# Patient Record
Sex: FEMALE | Race: WHITE | Employment: OTHER | URBAN - METROPOLITAN AREA
[De-identification: names, ages, dates, MRNs, and addresses within clinical notes are randomized per-mention and may not be internally consistent; named-entity substitution may affect disease eponyms.]

---

## 2017-11-15 ENCOUNTER — HOSPITAL ENCOUNTER (OUTPATIENT)
Dept: GENERAL RADIOLOGY | Age: 72
Discharge: HOME | End: 2017-11-15
Attending: INTERNAL MEDICINE
Payer: MEDICARE

## 2017-11-15 ENCOUNTER — HOSPITAL ENCOUNTER (OUTPATIENT)
Dept: MAMMOGRAPHY | Age: 72
Discharge: HOME | End: 2017-11-15
Attending: INTERNAL MEDICINE
Payer: MEDICARE

## 2017-11-15 DIAGNOSIS — Z78.0 MENOPAUSE: ICD-10-CM

## 2017-11-15 DIAGNOSIS — Z12.31 VISIT FOR SCREENING MAMMOGRAM: ICD-10-CM

## 2017-11-15 DIAGNOSIS — Z78.0 POST-MENOPAUSAL: ICD-10-CM

## 2017-11-15 DIAGNOSIS — Z13.820 ENCOUNTER FOR SCREENING FOR OSTEOPOROSIS: ICD-10-CM

## 2017-11-15 PROCEDURE — 77063 BREAST TOMOSYNTHESIS BI: CPT

## 2017-11-15 PROCEDURE — 77080 DXA BONE DENSITY AXIAL: CPT

## 2018-10-15 ENCOUNTER — OFFICE VISIT (OUTPATIENT)
Dept: INTERNAL MEDICINE CLINIC | Age: 73
End: 2018-10-15

## 2018-10-15 VITALS
HEART RATE: 69 BPM | SYSTOLIC BLOOD PRESSURE: 171 MMHG | DIASTOLIC BLOOD PRESSURE: 89 MMHG | WEIGHT: 178.2 LBS | RESPIRATION RATE: 16 BRPM | BODY MASS INDEX: 32.79 KG/M2 | TEMPERATURE: 98.2 F | HEIGHT: 62 IN

## 2018-10-15 DIAGNOSIS — E78.2 MIXED HYPERLIPIDEMIA: ICD-10-CM

## 2018-10-15 DIAGNOSIS — E03.9 ACQUIRED HYPOTHYROIDISM: ICD-10-CM

## 2018-10-15 DIAGNOSIS — I10 ESSENTIAL HYPERTENSION: Primary | ICD-10-CM

## 2018-10-15 DIAGNOSIS — G89.29 OTHER CHRONIC PAIN: ICD-10-CM

## 2018-10-15 RX ORDER — CELECOXIB 200 MG/1
CAPSULE ORAL
COMMUNITY
Start: 2018-07-23 | End: 2019-06-17 | Stop reason: SDUPTHER

## 2018-10-15 RX ORDER — DICLOFENAC SODIUM 10 MG/G
GEL TOPICAL
COMMUNITY
Start: 2018-09-06

## 2018-10-15 RX ORDER — CITALOPRAM 10 MG/1
TABLET ORAL
Refills: 0 | COMMUNITY
Start: 2018-09-04 | End: 2019-01-18

## 2018-10-15 RX ORDER — TRAMADOL HYDROCHLORIDE 50 MG/1
TABLET ORAL
Refills: 0 | COMMUNITY
Start: 2018-10-11 | End: 2019-02-27 | Stop reason: SDUPTHER

## 2018-10-15 RX ORDER — ATORVASTATIN CALCIUM 20 MG/1
TABLET, FILM COATED ORAL
Refills: 0 | COMMUNITY
Start: 2018-10-11 | End: 2019-06-17 | Stop reason: SDUPTHER

## 2018-10-15 RX ORDER — SULFAMETHOXAZOLE AND TRIMETHOPRIM 800; 160 MG/1; MG/1
TABLET ORAL
Refills: 0 | COMMUNITY
Start: 2018-09-04

## 2018-10-15 RX ORDER — RABEPRAZOLE SODIUM 20 MG/1
TABLET, DELAYED RELEASE ORAL
Refills: 0 | COMMUNITY
Start: 2018-07-16 | End: 2019-01-18 | Stop reason: SDUPTHER

## 2018-10-15 RX ORDER — LEVOTHYROXINE SODIUM 25 UG/1
TABLET ORAL
Refills: 0 | COMMUNITY
Start: 2018-09-04 | End: 2019-02-28 | Stop reason: SDUPTHER

## 2018-10-15 RX ORDER — PREGABALIN 25 MG/1
CAPSULE ORAL
Refills: 0 | COMMUNITY
Start: 2018-08-01

## 2018-10-15 NOTE — PROGRESS NOTES
ROOM # 17 Identified pt with two pt identifiers(name and ). Reviewed record in preparation for visit and have obtained necessary documentation. Chief Complaint Patient presents with [address] [place] [address] Ang So preferred language for health care discussion is english/other. Is the patient using any DME equipment during OV? NO Ang So is due for: 
Health Maintenance Due Topic  Hepatitis C Screening  DTaP/Tdap/Td series (1 - Tdap)  Shingrix Vaccine Age 50> (1 of 2)  FOBT Q 1 YEAR AGE 50-75  GLAUCOMA SCREENING Q2Y  Pneumococcal 65+ Low/Medium Risk (1 of 2 - PCV13)  Influenza Age 5 to Adult [address] Maintenance reviewed and discussed per provider Please order/place referral if appropriate. Advance Directive: 1. Do you have an advance directive in place? Patient Reply: NO 
 
2. If not, would you like material regarding how to put one in place? NO Coordination of Care: 1. Have you been to the ER, urgent care clinic since your last visit? Hospitalized since your last visit? NO 
 
2. Have you seen or consulted any other health care providers outside of the [address] [place] [address] since your last visit? Include any pap smears or colon screening. YES Patient is accompanied by spouse I have received verbal consent from Ang So to discuss any/all medical information while they are present in the room. Learning Assessment: 
No flowsheet data found. Depression Screening: PHQ over the last two weeks 10/15/2018 Little interest or pleasure in doing things Not at all Feeling down, depressed, irritable, or hopeless Not at all Total Score PHQ 2 0 Abuse Screening: No flowsheet data found. Fall Risk Fall Risk Assessment, last 12 mths 10/15/2018 Able to walk? Yes Fall in past 12 months?  No

## 2018-10-15 NOTE — PATIENT INSTRUCTIONS
Learning About High Blood Pressure What is high blood pressure? Blood pressure is a measure of how hard the blood pushes against the walls of your arteries. It's normal for blood pressure to go up and down throughout the day, but if it stays up, you have high blood pressure. Another name for high blood pressure is hypertension. Two numbers tell you your blood pressure. The first number is the systolic pressure. It shows how hard the blood pushes when your heart is pumping. The second number is the diastolic pressure. It shows how hard the blood pushes between heartbeats, when your heart is relaxed and filling with blood. A blood pressure of less than 120/80 (say \"120 over 80\") is ideal for an adult. High blood pressure is 130/80 or higher. You have high blood pressure if your top number is 130 or higher or your bottom number is 80 or higher, or both. What happens when you have high blood pressure? · Blood flows through your arteries with too much force. Over time, this damages the walls of your arteries. But you can't feel it. High blood pressure usually doesn't cause symptoms. · Fat and calcium start to build up in your arteries. This buildup is called plaque. Plaque makes your arteries narrower and stiffer. Blood can't flow through them as easily. · This lack of good blood flow starts to damage some of the organs in your body. This can lead to problems such as coronary artery disease and heart attack, heart failure, stroke, kidney failure, and eye damage. How can you prevent high blood pressure? · Stay at a healthy weight. · Try to limit how much sodium you eat to less than 2,300 milligrams (mg) a day. If you limit your sodium to 1,500 mg a day, you can lower your blood pressure even more. ¨ Buy foods that are labeled \"unsalted,\" \"sodium-free,\" or \"low-sodium. \" Foods labeled \"reduced-sodium\" and \"light sodium\" may still have too much sodium. ¨ Flavor your food with garlic, lemon juice, onion, vinegar, herbs, and spices instead of salt. Do not use soy sauce, steak sauce, onion salt, garlic salt, mustard, or ketchup on your food. ¨ Use less salt (or none) when recipes call for it. You can often use half the salt a recipe calls for without losing flavor. · Be physically active. Get at least 30 minutes of exercise on most days of the week. Walking is a good choice. You also may want to do other activities, such as running, swimming, cycling, or playing tennis or team sports. · Limit alcohol to 2 drinks a day for men and 1 drink a day for women. · Eat plenty of fruits, vegetables, and low-fat dairy products. Eat less saturated and total fats. How is high blood pressure treated? · Your doctor will suggest making lifestyle changes. For example, your doctor may ask you to eat healthy foods, quit smoking, lose extra weight, and be more active. · If lifestyle changes don't help enough, your doctor may recommend that you take medicine. · When blood pressure is very high, medicines are needed to lower it. Follow-up care is a key part of your treatment and safety. Be sure to make and go to all appointments, and call your doctor if you are having problems. It's also a good idea to know your test results and keep a list of the medicines you take. Where can you learn more? Go to http://ayden-clifford.info/. Enter P501 in the search box to learn more about \"Learning About High Blood Pressure. \" Current as of: December 6, 2017 Content Version: 11.8 © 7875-1962 eFuelDepot. Care instructions adapted under license by [employer]Chatter (which disclaims liability or warranty for this information). If you have questions about a medical condition or this instruction, always ask your healthcare professional. [address] any warranty or liability for your use of this information.

## 2018-10-15 NOTE — MR AVS SNAPSHOT
[address] [place] [address] 
 
 
 [address] [facility] [address] 
[phone] Patient: Ang Arceo MRN: [id number] CIJ:[id number] Visit Information Date & Time Provider Department Dept. Phone Encounter #  
 10/15/2018  9:45 AM [facility], [address] [phone] [id number] Follow-up Instructions Return in about 3 months (around 1/15/2019), or if symptoms worsen or fail to improve, for hypertension. Upcoming Health Maintenance Date Due Hepatitis C Screening 1945 DTaP/Tdap/Td series (1 - Tdap) 9/23/1967 Shingrix Vaccine Age 50> (1 of 2) 9/23/1996 FOBT Q 1 YEAR AGE 50-75 9/23/1996 GLAUCOMA SCREENING Q2Y 9/23/2011 Pneumococcal 65+ Low/Medium Risk (1 of 2 - PCV13) 9/23/2011 Influenza Age 5 to Adult 8/1/2018 MEDICARE YEARLY EXAM 10/11/2018 BREAST CANCER SCRN MAMMOGRAM 11/15/2019 Allergies as of 10/15/2018  Review Complete On: 10/15/2018 By: Ricky Charles Severity Noted Reaction Type Reactions Other Plant, Animal, Environmental  02/24/2016    Other (comments) Pollen and mold-Seasonal  
  
Current Immunizations  Never Reviewed No immunizations on file. Not reviewed this visit You Were Diagnosed With   
  
 Codes Comments Essential hypertension    -  Primary ICD-10-CM: I10 
ICD-9-CM: 401.9 Other chronic pain     ICD-10-CM: G89.29 ICD-9-CM: 338.29 Acquired hypothyroidism     ICD-10-CM: E03.9 ICD-9-CM: 244.9 Mixed hyperlipidemia     ICD-10-CM: E78.2 ICD-9-CM: 272.2 BMI 32.0-32.9,adult     ICD-10-CM: Q80.05 
ICD-9-CM: V85.32 Vitals BP Pulse Temp Resp Height(growth percentile) Weight(growth percentile) 171/89 (BP 1 Location: Right arm, BP Patient Position: Sitting) 69 98.2 °F (36.8 °C) (Oral) 16 5' 2\" (1.575 m) 178 lb 3.2 oz (80.8 kg) BMI Smoking Status 32.59 kg/m2 Former Smoker Vitals History BMI and BSA Data Body Mass Index Body Surface Area 32.59 kg/m 2 1.88 m 2 Preferred Pharmacy Pharmacy Name Phone [facility]  [facility] [phone] Your Updated Medication List  
  
   
This list is accurate as of 10/15/18 10:50 AM.  Always use your most recent med list.  
  
  
  
  
 atorvastatin 20 mg tablet Commonly known as:  LIPITOR  
take 1 tablet by mouth once daily  
  
 celecoxib 200 mg capsule Commonly known as:  CELEBREX  
  
 citalopram 10 mg tablet Commonly known as:  CELEXA  
take 1 tablet by mouth once daily  
  
 diclofenac 1 % Gel Commonly known as:  VOLTAREN  
  
 levothyroxine 25 mcg tablet Commonly known as:  SYNTHROID  
take 1 tablet by mouth once daily LYRICA 25 mg capsule Generic drug:  pregabalin RABEprazole 20 mg tablet Commonly known as:  ACIPHEX  
take 1 tablet by mouth once daily swallow WHOLE  
  
 traMADol 50 mg tablet Commonly known as:  ULTRAM  
  
 trimethoprim-sulfamethoxazole 160-800 mg per tablet Commonly known as:  BACTRIM DS, SEPTRA DS  
take 1 tablet by mouth every 12 hours Follow-up Instructions Return in about 3 months (around 1/15/2019), or if symptoms worsen or fail to improve, for hypertension. Patient Instructions Learning About High Blood Pressure What is high blood pressure? Blood pressure is a measure of how hard the blood pushes against the walls of your arteries. It's normal for blood pressure to go up and down throughout the day, but if it stays up, you have high blood pressure. Another name for high blood pressure is hypertension. Two numbers tell you your blood pressure. The first number is the systolic pressure. It shows how hard the blood pushes when your heart is pumping. The second number is the diastolic pressure. It shows how hard the blood pushes between heartbeats, when your heart is relaxed and filling with blood. A blood pressure of less than 120/80 (say \"120 over 80\") is ideal for an adult. High blood pressure is 130/80 or higher. You have high blood pressure if your top number is 130 or higher or your bottom number is 80 or higher, or both. What happens when you have high blood pressure? · Blood flows through your arteries with too much force. Over time, this damages the walls of your arteries. But you can't feel it. High blood pressure usually doesn't cause symptoms. · Fat and calcium start to build up in your arteries. This buildup is called plaque. Plaque makes your arteries narrower and stiffer. Blood can't flow through them as easily. · This lack of good blood flow starts to damage some of the organs in your body. This can lead to problems such as coronary artery disease and heart attack, heart failure, stroke, kidney failure, and eye damage. How can you prevent high blood pressure? · Stay at a healthy weight. · Try to limit how much sodium you eat to less than 2,300 milligrams (mg) a day. If you limit your sodium to 1,500 mg a day, you can lower your blood pressure even more. ¨ Buy foods that are labeled \"unsalted,\" \"sodium-free,\" or \"low-sodium. \" Foods labeled \"reduced-sodium\" and \"light sodium\" may still have too much sodium. ¨ Flavor your food with garlic, lemon juice, onion, vinegar, herbs, and spices instead of salt. Do not use soy sauce, steak sauce, onion salt, garlic salt, mustard, or ketchup on your food. ¨ Use less salt (or none) when recipes call for it. You can often use half the salt a recipe calls for without losing flavor. · Be physically active. Get at least 30 minutes of exercise on most days of the week. Walking is a good choice. You also may want to do other activities, such as running, swimming, cycling, or playing tennis or team sports. · Limit alcohol to 2 drinks a day for men and 1 drink a day for women. · Eat plenty of fruits, vegetables, and low-fat dairy products.  Eat less saturated and total fats. How is high blood pressure treated? · Your doctor will suggest making lifestyle changes. For example, your doctor may ask you to eat healthy foods, quit smoking, lose extra weight, and be more active. · If lifestyle changes don't help enough, your doctor may recommend that you take medicine. · When blood pressure is very high, medicines are needed to lower it. Follow-up care is a key part of your treatment and safety. Be sure to make and go to all appointments, and call your doctor if you are having problems. It's also a good idea to know your test results and keep a list of the medicines you take. Where can you learn more? Go to http://ayden-clifford.info/. Enter P501 in the search box to learn more about \"Learning About High Blood Pressure. \" Current as of: December 6, 2017 Content Version: 11.8 © 3394-0220 In2Games. Care instructions adapted under license by [employer] (which disclaims liability or warranty for this information). If you have questions about a medical condition or this instruction, always ask your healthcare professional. [address] any warranty or liability for your use of this information. Introducing [facility]! [facility] introduces SquareMarket patient portal. Now you can access parts of your medical record, email your doctor's office, and request medication refills online. 1. In your internet browser, go to [email or web address] 2. Click on the First Time User? Click Here link in the Sign In box. You will see the New Member Sign Up page. 3. Enter your SquareMarket Access Code exactly as it appears below. You will not need to use this code after youve completed the sign-up process. If you do not sign up before the expiration date, you must request a new code. · Square[employer] Access Code: [email or web address] Expires: 1/13/2019 10:47 AM 
 
 4. Enter the last four digits of your Social Security Number (xxxx) and Date of Birth (mm/dd/yyyy) as indicated and click Submit. You will be taken to the next sign-up page. 5. Create a InstallFree ID. This will be your InstallFree login ID and cannot be changed, so think of one that is secure and easy to remember. 6. Create a InstallFree password. You can change your password at any time. 7. Enter your Password Reset Question and Answer. This can be used at a later time if you forget your password. 8. Enter your e-mail address. You will receive e-mail notification when new information is available in [address] 9. Click Sign Up. You can now view and download portions of your medical record. 10. Click the Download Summary menu link to download a portable copy of your medical information. If you have questions, please visit the Frequently Asked Questions section of the InstallFree website. Remember, InstallFree is NOT to be used for urgent needs. For medical emergencies, dial 911. Now available from your iPhone and Android! Please provide this summary of care documentation to your next provider. Your primary care clinician is listed as Sanchez Vences. If you have any questions after today's visit, please call [phone].

## 2018-10-15 NOTE — PROGRESS NOTES
HISTORY OF PRESENT ILLNESS Judit Pinzon is a 68 y.o. female. HPI Comments: 69 yo female here to establish care, evaluation of HTN, pain. Has been taking tramadol for chronic arthritic pain. Pain L ankle has increased some. H/o LBP,  Sciatica. Takes Tramadol usually q day. Doing PT for ankle pain Prescribed Lyrica 25mg (didn't tolerate higher dose) and not sure this helping. She reports BP is usually elevated at office visits. She does check this at home and SBP is typically in 130s-140s. She has h/o hypothyroidism. No recent changes in synthroid dose. On Lipitor for HLD. Establish Care Pertinent negatives include no chest pain, no headaches and no shortness of breath. Review of Systems Constitutional: Negative for chills, fever and weight loss. HENT: Negative for congestion and ear pain. Eyes: Negative for blurred vision and pain. Respiratory: Negative for cough and shortness of breath. Cardiovascular: Negative for chest pain, palpitations and leg swelling. Gastrointestinal: Negative for nausea and vomiting. Genitourinary: Negative for frequency and urgency. Musculoskeletal: Positive for joint pain. Negative for myalgias. Skin: Negative for itching and rash. Neurological: Negative for dizziness, tingling and headaches. Psychiatric/Behavioral: Negative for depression. The patient is not nervous/anxious. Past Medical History:  
Diagnosis Date  Arthritis  Thyroid disease Past Surgical History:  
Procedure Laterality Date  HX ORTHOPAEDIC No current outpatient prescriptions on file prior to visit. No current facility-administered medications on file prior to visit. Allergies Allergen Reactions  Other Plant, Animal, Environmental Other (comments) Pollen and mold-Seasonal  
 
Family History Problem Relation Age of Onset  Breast Cancer Sister 61  Arthritis-osteo Sister  Breast Cancer Sister 48 Social History Social History  Marital status:  Spouse name: N/A  
 Number of children: N/A  
 Years of education: N/A Occupational History  Not on file. Social History Main Topics  Smoking status: Former Smoker  Smokeless tobacco: Never Used  Alcohol use Yes Comment: rarely  Drug use: No  
 Sexual activity: Yes  
  Partners: Male Birth control/ protection: None Other Topics Concern  Not on file Social History Narrative Physical Exam  
Constitutional: She appears well-developed and well-nourished. No distress. /89 (BP 1 Location: Right arm, BP Patient Position: Sitting)  Pulse 69  Temp 98.2 °F (36.8 °C) (Oral)   Resp 16  Ht 5' 2\" (1.575 m)  Wt 178 lb 3.2 oz (80.8 kg)  BMI 32.59 kg/m2 Eyes: EOM are normal. Right eye exhibits no discharge. Left eye exhibits no discharge. No scleral icterus. Neck: Neck supple. Cardiovascular: Normal rate, regular rhythm and normal heart sounds. Exam reveals no gallop and no friction rub. No murmur heard. Pulmonary/Chest: Effort normal and breath sounds normal. No respiratory distress. She has no wheezes. She has no rales. Abdominal: Soft. She exhibits no distension. Musculoskeletal: She exhibits no edema or tenderness. Lymphadenopathy:  
  She has no cervical adenopathy. Neurological: She is alert. She exhibits normal muscle tone. Skin: Skin is warm and dry. Psychiatric: She has a normal mood and affect. No results found for: NA, K, CL, CO2, AGAP, GLU, BUN, CREA, BUCR, GFRAA, GFRNA, CA, TBIL, TBILI, GPT, SGOT, AP, TP, ALB, GLOB, AGRAT, ALT [facility] JAN 2014 creatinine 0.9 ASSESSMENT and PLAN 
  ICD-10-CM ICD-9-CM 1. Essential hypertension I10 401.9 2. Other chronic pain G89.29 338.29   
3. Acquired hypothyroidism E03.9 244.9 4. Mixed hyperlipidemia E78.2 272.2 5. BMI 32.0-32.9,adult Z68.32 V85.32 Old records requested. Will continue with current medication for now. Discussed consideration for antihypertensive. She will monitor BP at home for now. Provided info on DASH diet on AVS.

## 2019-01-18 ENCOUNTER — OFFICE VISIT (OUTPATIENT)
Dept: INTERNAL MEDICINE CLINIC | Age: 74
End: 2019-01-18

## 2019-01-18 ENCOUNTER — HOSPITAL ENCOUNTER (OUTPATIENT)
Dept: LAB | Age: 74
Discharge: HOME | End: 2019-01-18
Payer: MEDICARE

## 2019-01-18 VITALS
HEIGHT: 62 IN | WEIGHT: 179.2 LBS | TEMPERATURE: 96.8 F | OXYGEN SATURATION: 99 % | DIASTOLIC BLOOD PRESSURE: 82 MMHG | SYSTOLIC BLOOD PRESSURE: 157 MMHG | RESPIRATION RATE: 18 BRPM | BODY MASS INDEX: 32.97 KG/M2 | HEART RATE: 78 BPM

## 2019-01-18 DIAGNOSIS — I10 ESSENTIAL HYPERTENSION: ICD-10-CM

## 2019-01-18 DIAGNOSIS — I10 ESSENTIAL HYPERTENSION: Primary | ICD-10-CM

## 2019-01-18 DIAGNOSIS — G89.29 OTHER CHRONIC PAIN: ICD-10-CM

## 2019-01-18 DIAGNOSIS — E78.2 MIXED HYPERLIPIDEMIA: ICD-10-CM

## 2019-01-18 DIAGNOSIS — F43.21 ADJUSTMENT DISORDER WITH DEPRESSED MOOD: ICD-10-CM

## 2019-01-18 DIAGNOSIS — E03.9 ACQUIRED HYPOTHYROIDISM: ICD-10-CM

## 2019-01-18 LAB
ALBUMIN SERPL-MCNC: 3.8 G/DL (ref 3.4–5)
ALBUMIN/GLOB SERPL: 1.3 {RATIO} (ref 0.8–1.7)
ALP SERPL-CCNC: 79 U/L (ref 45–117)
ALT SERPL-CCNC: 39 U/L (ref 13–56)
ANION GAP SERPL CALC-SCNC: 5 MMOL/L (ref 3–18)
AST SERPL-CCNC: 21 U/L (ref 15–37)
BASOPHILS # BLD: 0 K/UL (ref 0–0.1)
BASOPHILS NFR BLD: 0 % (ref 0–2)
BILIRUB SERPL-MCNC: 0.3 MG/DL (ref 0.2–1)
BUN SERPL-MCNC: 13 MG/DL (ref 7–18)
BUN/CREAT SERPL: 14 (ref 12–20)
CALCIUM SERPL-MCNC: 8.7 MG/DL (ref 8.5–10.1)
CHLORIDE SERPL-SCNC: 106 MMOL/L (ref 100–108)
CHOLEST SERPL-MCNC: 204 MG/DL
CO2 SERPL-SCNC: 29 MMOL/L (ref 21–32)
CREAT SERPL-MCNC: 0.96 MG/DL (ref 0.6–1.3)
DIFFERENTIAL METHOD BLD: ABNORMAL
EOSINOPHIL # BLD: 0.1 K/UL (ref 0–0.4)
EOSINOPHIL NFR BLD: 1 % (ref 0–5)
ERYTHROCYTE [DISTWIDTH] IN BLOOD BY AUTOMATED COUNT: 13.2 % (ref 11.6–14.5)
GLOBULIN SER CALC-MCNC: 3 G/DL (ref 2–4)
GLUCOSE SERPL-MCNC: 114 MG/DL (ref 74–99)
HCT VFR BLD AUTO: 46.7 % (ref 35–45)
HDLC SERPL-MCNC: 62 MG/DL (ref 40–60)
HDLC SERPL: 3.3 {RATIO} (ref 0–5)
HGB BLD-MCNC: 15 G/DL (ref 12–16)
LDLC SERPL CALC-MCNC: 96.8 MG/DL (ref 0–100)
LIPID PROFILE,FLP: ABNORMAL
LYMPHOCYTES # BLD: 1.8 K/UL (ref 0.9–3.6)
LYMPHOCYTES NFR BLD: 26 % (ref 21–52)
MCH RBC QN AUTO: 31.1 PG (ref 24–34)
MCHC RBC AUTO-ENTMCNC: 32.1 G/DL (ref 31–37)
MCV RBC AUTO: 96.9 FL (ref 74–97)
MONOCYTES # BLD: 0.5 K/UL (ref 0.05–1.2)
MONOCYTES NFR BLD: 7 % (ref 3–10)
NEUTS SEG # BLD: 4.6 K/UL (ref 1.8–8)
NEUTS SEG NFR BLD: 66 % (ref 40–73)
PLATELET # BLD AUTO: 280 K/UL (ref 135–420)
PMV BLD AUTO: 10.2 FL (ref 9.2–11.8)
POTASSIUM SERPL-SCNC: 4.4 MMOL/L (ref 3.5–5.5)
PROT SERPL-MCNC: 6.8 G/DL (ref 6.4–8.2)
RBC # BLD AUTO: 4.82 M/UL (ref 4.2–5.3)
SODIUM SERPL-SCNC: 140 MMOL/L (ref 136–145)
T4 FREE SERPL-MCNC: 1 NG/DL (ref 0.7–1.5)
TRIGL SERPL-MCNC: 226 MG/DL (ref ?–150)
TSH SERPL DL<=0.05 MIU/L-ACNC: 0.59 UIU/ML (ref 0.36–3.74)
VLDLC SERPL CALC-MCNC: 45.2 MG/DL
WBC # BLD AUTO: 6.9 K/UL (ref 4.6–13.2)

## 2019-01-18 PROCEDURE — 80061 LIPID PANEL: CPT

## 2019-01-18 PROCEDURE — 80053 COMPREHEN METABOLIC PANEL: CPT

## 2019-01-18 PROCEDURE — 84443 ASSAY THYROID STIM HORMONE: CPT

## 2019-01-18 PROCEDURE — 85025 COMPLETE CBC W/AUTO DIFF WBC: CPT

## 2019-01-18 PROCEDURE — 84439 ASSAY OF FREE THYROXINE: CPT

## 2019-01-18 RX ORDER — RABEPRAZOLE SODIUM 20 MG/1
TABLET, DELAYED RELEASE ORAL
Qty: 90 TAB | Refills: 3 | Status: SHIPPED | OUTPATIENT
Start: 2019-01-18 | End: 2019-02-21 | Stop reason: SDUPTHER

## 2019-01-18 RX ORDER — CITALOPRAM 10 MG/1
TABLET ORAL
Qty: 60 TAB | Refills: 0
Start: 2019-01-18 | End: 2019-02-27 | Stop reason: SDUPTHER

## 2019-01-18 NOTE — PATIENT INSTRUCTIONS
Adjustment Disorder: Care Instructions Your Care Instructions Adjustment disorder means that you have emotional or behavioral problems because of stress. But your response to the stress is far more severe than a normal response. It is severe enough to affect your work or social life and may cause depression and physical pains and problems. Events that may cause this response can include a divorce, money problems, or starting school or a new job. It might be anything that causes some stress. This disorder is most often a short-term problem. It happens within 3 months of the stressful event or change. If the response lasts longer than 6 months after the event ends, you may have a more serious disorder. Follow-up care is a key part of your treatment and safety. Be sure to make and go to all appointments, and call your doctor if you are having problems. It's also a good idea to know your test results and keep a list of the medicines you take. How can you care for yourself at home? · Go to all counseling sessions. Do not skip any because you are feeling better. · If your doctor prescribed medicines, take them exactly as prescribed. Call your doctor if you think you are having a problem with your medicine. You will get more details on the specific medicines your doctor prescribes. · Discuss the causes of your stress with a good friend or family member. Or you can join a support group for people with similar problems. Talking to others sometimes relieves stress. · Get at least 30 minutes of exercise on most days of the week. Walking is a good choice. You also may want to do other activities, such as running, swimming, cycling, or playing tennis or team sports. Relaxation techniques Do relaxation exercises 10 to 20 minutes a day. You can play soothing, relaxing music while you do them, if you wish.  
· Tell others in your house that you are going to do your relaxation exercises. Ask them not to disturb you. · Find a comfortable, quiet place. · Lie down on your back, or sit with your back straight. · Focus on your breathing. Make it slow and steady. · Breathe in through your nose. Breathe out through either your nose or mouth. · Breathe deeply, filling up the area between your navel and your rib cage. Breathe so that your belly goes up and down. · Do not hold your breath. · Breathe like this for 5 to 10 minutes. Notice the feeling of calmness throughout your whole body. As you continue to breathe slowly and deeply, relax by doing these next steps for another 5 to 10 minutes: · Tighten and relax each muscle group in your body. Start at your toes, and work your way up to your head. · Imagine your muscle groups relaxing and getting heavy. · Empty your mind of all thoughts. · Let yourself relax more and more deeply. · Be aware of the state of calmness that surrounds you. · When your relaxation time is over, you can bring yourself back to alertness by moving your fingers and toes. Then move your hands and feet. And then move your entire body. Sometimes people fall asleep during relaxation. But they most often wake up soon. · Always give yourself time to return to full alertness before you drive a car. Wait to do anything that might cause an accident if you are not fully alert. Never play a relaxation tape while you drive a car. When should you call for help? Call 911 anytime you think you may need emergency care. For example, call if: 
  · You feel you cannot stop from hurting yourself or someone else. Keep the numbers for these national suicide hotlines: 7-889-637-TALK (0-569.995.3186) and 2-373-UIOZXRN (5-270.986.6347). If you or someone you know talks about suicide or feeling hopeless, get help right away.  
 Watch closely for changes in your health, and be sure to contact your doctor if: 
  · You have new anxiety, or your anxiety gets worse.   · You have been feeling sad, depressed, or hopeless or have lost interest in things that you usually enjoy.  
  · You do not get better as expected. Where can you learn more? Go to http://ayden-clifford.info/. Enter 0688 [id number] [id number] in the search box to learn more about \"Adjustment Disorder: Care Instructions. \" Current as of: June 28, 2018 Content Version: 11.9 © 9105-1532 Asuum, OneSchool. Care instructions adapted under license by [facility] (which disclaims liability or warranty for this information). If you have questions about a medical condition or this instruction, always ask your healthcare professional. [place] [address] any warranty or liability for your use of this information.

## 2019-01-18 NOTE — PROGRESS NOTES
HISTORY OF PRESENT ILLNESS Makenna Lowe is a 68 y.o. female. 67 yo female here for f/u of HTN, pain, depressive sx. Notes she has been under increased stress lately preparing her trust, caring for spouse. Tearful at times. Has been taking celexa 10 mg daily HTN: Elevated when comes to visits. Is better than last time. No CP, SOB Hypothyroidism: no recent labs. Chronic pain has been stable. Foot pain improved with new walking shoes. Review of Systems Constitutional: Negative for chills, fever and weight loss. HENT: Negative for congestion and ear pain. Eyes: Negative for blurred vision and pain. Respiratory: Negative for cough and shortness of breath. Cardiovascular: Negative for chest pain, palpitations and leg swelling. Gastrointestinal: Negative for nausea and vomiting. Genitourinary: Negative for frequency and urgency. Musculoskeletal: Positive for joint pain. Negative for myalgias. Skin: Negative for itching and rash. Neurological: Negative for dizziness, tingling and headaches. Psychiatric/Behavioral: Positive for depression. The patient is not nervous/anxious. Past Medical History:  
Diagnosis Date  Arthritis  Thyroid disease Patient Active Problem List  
Diagnosis Code  Essential hypertension I10  
 Acquired hypothyroidism E03.9 Current Outpatient Medications on File Prior to Visit Medication Sig Dispense Refill  atorvastatin (LIPITOR) 20 mg tablet take 1 tablet by mouth once daily  0  
 celecoxib (CELEBREX) 200 mg capsule  citalopram (CELEXA) 10 mg tablet take 1 tablet by mouth once daily  0  
 diclofenac (VOLTAREN) 1 % gel  levothyroxine (SYNTHROID) 25 mcg tablet take 1 tablet by mouth once daily  0  
 LYRICA 25 mg capsule   0  
 RABEprazole (ACIPHEX) 20 mg tablet take 1 tablet by mouth once daily swallow WHOLE  0  
 traMADol (ULTRAM) 50 mg tablet   0  
 trimethoprim-sulfamethoxazole (BACTRIM DS, SEPTRA DS) 160-800 mg per tablet take 1 tablet by mouth every 12 hours  0 No current facility-administered medications on file prior to visit. Social History Tobacco Use  Smoking status: Former Smoker  Smokeless tobacco: Never Used Substance Use Topics  Alcohol use: Yes Comment: rarely  Drug use: No  
 
Physical Exam  
Constitutional: She appears well-developed and well-nourished. No distress. /82 (BP 1 Location: Left arm, BP Patient Position: Sitting)   Pulse 78   Temp 96.8 °F (36 °C) (Oral)   Resp 18   Ht 5' 2\" (1.575 m)   Wt 179 lb 3.2 oz (81.3 kg)   SpO2 99%   BMI 32.78 kg/m² Eyes: EOM are normal. Right eye exhibits no discharge. Left eye exhibits no discharge. No scleral icterus. Neck: Neck supple. Cardiovascular: Normal rate, regular rhythm and normal heart sounds. Exam reveals no gallop and no friction rub. No murmur heard. Pulmonary/Chest: Effort normal and breath sounds normal. No respiratory distress. She has no wheezes. She has no rales. Musculoskeletal: She exhibits no edema or tenderness. Lymphadenopathy:  
  She has no cervical adenopathy. Neurological: She is alert. She exhibits normal muscle tone. Skin: Skin is warm and dry. Psychiatric: She has a normal mood and affect. No results found for: CHOL, CHOLPOCT, CHOLX, CHLST, CHOLV, HDL, HDLPOC, LDL, LDLCPOC, LDLC, DLDLP, VLDLC, VLDL, TGLX, TRIGL, TRIGP, TGLPOCT, CHHD, CHHDX ASSESSMENT and PLAN 
  ICD-10-CM ICD-9-CM 1. Essential hypertension I10 401.9 CBC WITH AUTOMATED DIFF  
   METABOLIC PANEL, COMPREHENSIVE 2. Acquired hypothyroidism E03.9 244.9 TSH 3RD GENERATION  
   T4, FREE 3. Mixed hyperlipidemia E78.2 272.2 LIPID PANEL 4. Other chronic pain G89.29 338.29   
5. Adjustment disorder with depressed mood F43.21 309.0 Repeat labs today Will try increasing Celexa to 20 mg daily. Consider psychology Will resume monitoring BP at home

## 2019-01-18 NOTE — PROGRESS NOTES
ROOM # 16 Identified pt with two pt identifiers(name and ). Reviewed record in preparation for visit and have obtained necessary documentation. Chief Complaint Patient presents with  Hypertension Brock Joe preferred language for health care discussion is english/other. Is the patient using any DME equipment during OV? NO Brock Grout is due for: 
Health Maintenance Due Topic  Hepatitis C Screening  DTaP/Tdap/Td series (1 - Tdap)  Shingrix Vaccine Age 50> (1 of 2)  FOBT Q 1 YEAR AGE 50-75  GLAUCOMA SCREENING Q2Y  Pneumococcal 65+ Low/Medium Risk (1 of 2 - PCV13)  Influenza Age 5 to Adult [address] Maintenance reviewed and discussed per provider Please order/place referral if appropriate. Advance Directive: 1. Do you have an advance directive in place? Patient Reply: NO 
 
2. If not, would you like material regarding how to put one in place? NO Coordination of Care: 1. Have you been to the ER, urgent care clinic since your last visit? Hospitalized since your last visit? NO 
 
2. Have you seen or consulted any other health care providers outside of the [address] [place] [address] since your last visit? Include any pap smears or colon screening. NO Patient is accompanied by spouse I have received verbal consent from Brock Joe to discuss any/all medical information while they are present in the room. Learning Assessment: 
No flowsheet data found. Depression Screening: PHQ over the last two weeks 2019 10/15/2018 Little interest or pleasure in doing things Not at all Not at all Feeling down, depressed, irritable, or hopeless Not at all Not at all Total Score PHQ 2 0 0 Abuse Screening: No flowsheet data found. Fall Risk Fall Risk Assessment, last 12 mths 2019 10/15/2018 Able to walk? Yes Yes Fall in past 12 months?  No No

## 2019-02-19 ENCOUNTER — HOSPITAL ENCOUNTER (OUTPATIENT)
Dept: MAMMOGRAPHY | Age: 74
Discharge: HOME | End: 2019-02-19
Attending: INTERNAL MEDICINE
Payer: MEDICARE

## 2019-02-19 DIAGNOSIS — Z12.31 VISIT FOR SCREENING MAMMOGRAM: ICD-10-CM

## 2019-02-19 PROCEDURE — 77063 BREAST TOMOSYNTHESIS BI: CPT

## 2019-02-21 RX ORDER — RABEPRAZOLE SODIUM 20 MG/1
TABLET, DELAYED RELEASE ORAL
Qty: 90 TAB | Refills: 3 | Status: SHIPPED | OUTPATIENT
Start: 2019-02-21 | End: 2019-04-18 | Stop reason: SDUPTHER

## 2019-02-21 NOTE — TELEPHONE ENCOUNTER
Requested Prescriptions     Pending Prescriptions Disp Refills    RABEprazole (ACIPHEX) 20 mg tablet 90 Tab 3     Sig: take 1 tablet by mouth once daily swallow WHOLE

## 2019-02-27 DIAGNOSIS — G89.29 OTHER CHRONIC PAIN: Primary | ICD-10-CM

## 2019-02-27 NOTE — TELEPHONE ENCOUNTER
Requested Prescriptions     Pending Prescriptions Disp Refills    traMADol (ULTRAM) 50 mg tablet  0    citalopram (CELEXA) 10 mg tablet 60 Tab 0     Sig: take 2 tablet by mouth once daily

## 2019-02-28 RX ORDER — CITALOPRAM 10 MG/1
TABLET ORAL
Qty: 60 TAB | Refills: 0 | Status: SHIPPED | OUTPATIENT
Start: 2019-02-28 | End: 2019-04-18 | Stop reason: DRUGHIGH

## 2019-02-28 RX ORDER — TRAMADOL HYDROCHLORIDE 50 MG/1
50 TABLET ORAL
Qty: 90 TAB | Refills: 0 | Status: SHIPPED | OUTPATIENT
Start: 2019-02-28 | End: 2019-03-28

## 2019-02-28 RX ORDER — LEVOTHYROXINE SODIUM 25 UG/1
TABLET ORAL
Qty: 90 TAB | Refills: 3 | Status: SHIPPED | OUTPATIENT
Start: 2019-02-28

## 2019-02-28 NOTE — TELEPHONE ENCOUNTER
Requested Prescriptions     Pending Prescriptions Disp Refills    levothyroxine (SYNTHROID) 25 mcg tablet  0

## 2019-02-28 NOTE — TELEPHONE ENCOUNTER
PCP: Angela Grossman MD    Last appt: 1/18/2019  Future Appointments   Date Time Provider Irma Schreiber   4/18/2019 10:15 AM Angela Grossman MD [facility] SCHED       Requested Prescriptions     Pending Prescriptions Disp Refills    traMADol (ULTRAM) 50 mg tablet  0    citalopram (CELEXA) 10 mg tablet 60 Tab 0     Sig: take 2 tablet by mouth once daily       Request for a 30 or 90 day supply? Provider Discretion    Pharmacy: [place] [id number]    Other Comments:   printed and placed in PCP medication refill review folder.      Last UDS date: none  Pain contract signed: none

## 2019-04-18 ENCOUNTER — OFFICE VISIT (OUTPATIENT)
Dept: INTERNAL MEDICINE CLINIC | Age: 74
End: 2019-04-18

## 2019-04-18 VITALS
RESPIRATION RATE: 18 BRPM | SYSTOLIC BLOOD PRESSURE: 150 MMHG | OXYGEN SATURATION: 97 % | WEIGHT: 178 LBS | HEIGHT: 62 IN | TEMPERATURE: 98.5 F | BODY MASS INDEX: 32.76 KG/M2 | HEART RATE: 77 BPM | DIASTOLIC BLOOD PRESSURE: 82 MMHG

## 2019-04-18 DIAGNOSIS — F32.A DEPRESSION, UNSPECIFIED DEPRESSION TYPE: ICD-10-CM

## 2019-04-18 DIAGNOSIS — J30.1 SEASONAL ALLERGIC RHINITIS DUE TO POLLEN: Primary | ICD-10-CM

## 2019-04-18 DIAGNOSIS — I10 ESSENTIAL HYPERTENSION: ICD-10-CM

## 2019-04-18 RX ORDER — CITALOPRAM 20 MG/1
20 TABLET, FILM COATED ORAL DAILY
Qty: 90 TAB | Refills: 3 | Status: SHIPPED | OUTPATIENT
Start: 2019-04-18

## 2019-04-18 RX ORDER — MOMETASONE FUROATE 50 UG/1
2 SPRAY, METERED NASAL DAILY
COMMUNITY
End: 2019-04-18 | Stop reason: SDUPTHER

## 2019-04-18 RX ORDER — RABEPRAZOLE SODIUM 20 MG/1
TABLET, DELAYED RELEASE ORAL
Qty: 90 TAB | Refills: 3 | Status: SHIPPED | OUTPATIENT
Start: 2019-04-18

## 2019-04-18 RX ORDER — MOMETASONE FUROATE 50 UG/1
2 SPRAY, METERED NASAL DAILY
Qty: 3 CONTAINER | Refills: 3 | Status: SHIPPED | OUTPATIENT
Start: 2019-04-18

## 2019-04-18 RX ORDER — CITALOPRAM 10 MG/1
TABLET ORAL
Qty: 60 TAB | Refills: 0 | Status: CANCELLED | OUTPATIENT
Start: 2019-04-18

## 2019-04-18 RX ORDER — MONTELUKAST SODIUM 10 MG/1
10 TABLET ORAL DAILY
Qty: 90 TAB | Refills: 3 | Status: SHIPPED | OUTPATIENT
Start: 2019-04-18

## 2019-04-18 NOTE — PROGRESS NOTES
HISTORY OF PRESENT ILLNESS Tamanna Wallace is a 68 y.o. female. Here for f/u of HTN, allergies, depression BP elevated here but remains well controlled on home monitoring. Denies CP, SOB Seasonal allergies have been bad for the past month with high pollen count. Tries not to take medication daily as this makes her too sleepy. Had been on singulair in past which helped. Needs refill of nasal spray Depression improved since she increased Celexa to 20 mg daily GERD: Having difficulty getting Aciphex. Has been on multiple other PPIs in the past which have not been effective. Aciphex does keep sx controlled. Hypertension Pertinent negatives include no chest pain, no palpitations, no blurred vision, no headaches, no dizziness, no shortness of breath, no nausea and no vomiting. Review of Systems Constitutional: Negative for chills, fever and weight loss. HENT: Negative for congestion and ear pain. Eyes: Negative for blurred vision and pain. Respiratory: Negative for cough and shortness of breath. Cardiovascular: Negative for chest pain, palpitations and leg swelling. Gastrointestinal: Negative for nausea and vomiting. Genitourinary: Negative for frequency and urgency. Musculoskeletal: Negative for joint pain and myalgias. Skin: Negative for itching and rash. Neurological: Negative for dizziness, tingling and headaches. Psychiatric/Behavioral: Negative for depression. The patient is not nervous/anxious. Past Medical History:  
Diagnosis Date  Arthritis  Thyroid disease Patient Active Problem List  
Diagnosis Code  Essential hypertension I10  
 Acquired hypothyroidism E03.9  Mixed hyperlipidemia E78.2  Other chronic pain G89.29 Current Outpatient Medications on File Prior to Visit Medication Sig Dispense Refill  mometasone (NASONEX) 50 mcg/actuation nasal spray 2 Sprays daily.  citalopram (CELEXA) 10 mg tablet take 2 tablet by mouth once daily 60 Tab 0  
 levothyroxine (SYNTHROID) 25 mcg tablet take 1 tablet by mouth once daily 90 Tab 3  
 RABEprazole (ACIPHEX) 20 mg tablet take 1 tablet by mouth once daily swallow WHOLE 90 Tab 3  
 atorvastatin (LIPITOR) 20 mg tablet take 1 tablet by mouth once daily  0  
 celecoxib (CELEBREX) 200 mg capsule  diclofenac (VOLTAREN) 1 % gel  LYRICA 25 mg capsule   0  
 trimethoprim-sulfamethoxazole (BACTRIM DS, SEPTRA DS) 160-800 mg per tablet take 1 tablet by mouth every 12 hours  0 No current facility-administered medications on file prior to visit. Social History Tobacco Use  Smoking status: Former Smoker  Smokeless tobacco: Never Used Substance Use Topics  Alcohol use: Yes Comment: rarely  Drug use: No  
 
Physical Exam  
Constitutional: She appears well-developed and well-nourished. No distress. /82 (BP 1 Location: Left arm, BP Patient Position: Sitting)   Pulse 77   Temp 98.5 °F (36.9 °C) (Oral)   Resp 18   Ht 5' 2\" (1.575 m)   Wt 178 lb (80.7 kg)   SpO2 97%   BMI 32.56 kg/m² Eyes: EOM are normal. Right eye exhibits no discharge. Left eye exhibits no discharge. No scleral icterus. Neck: Neck supple. Cardiovascular: Normal rate, regular rhythm and normal heart sounds. Exam reveals no gallop and no friction rub. No murmur heard. Pulmonary/Chest: Effort normal and breath sounds normal. No respiratory distress. She has no wheezes. She has no rales. Musculoskeletal: She exhibits no edema or tenderness. Lymphadenopathy:  
  She has no cervical adenopathy. Neurological: She is alert. She exhibits normal muscle tone. Skin: Skin is warm and dry. Psychiatric: She has a normal mood and affect. Lab Results Component Value Date/Time  Sodium 140 01/18/2019 02:45 PM  
 Potassium 4.4 01/18/2019 02:45 PM  
 Chloride 106 01/18/2019 02:45 PM  
 CO2 29 01/18/2019 02:45 PM  
 Anion gap 5 01/18/2019 02:45 PM  
 Glucose 114 (H) 01/18/2019 02:45 PM  
 BUN 13 01/18/2019 02:45 PM  
 Creatinine 0.96 01/18/2019 02:45 PM  
 BUN/Creatinine ratio 14 01/18/2019 02:45 PM  
 GFR est AA >60 01/18/2019 02:45 PM  
 GFR est non-AA 57 (L) 01/18/2019 02:45 PM  
 Calcium 8.7 01/18/2019 02:45 PM  
 Bilirubin, total 0.3 01/18/2019 02:45 PM  
 AST (SGOT) 21 01/18/2019 02:45 PM  
 Alk. phosphatase 79 01/18/2019 02:45 PM  
 Protein, total 6.8 01/18/2019 02:45 PM  
 Albumin 3.8 01/18/2019 02:45 PM  
 Globulin 3.0 01/18/2019 02:45 PM  
 A-G Ratio 1.3 01/18/2019 02:45 PM  
 ALT (SGPT) 39 01/18/2019 02:45 PM  
 
Lab Results Component Value Date/Time WBC 6.9 01/18/2019 02:45 PM  
 HGB 15.0 01/18/2019 02:45 PM  
 HCT 46.7 (H) 01/18/2019 02:45 PM  
 PLATELET 532 64/77/9410 02:45 PM  
 MCV 96.9 01/18/2019 02:45 PM  
 
ASSESSMENT and PLAN 
  ICD-10-CM ICD-9-CM 1. Seasonal allergic rhinitis due to pollen J30.1 477.0 2. Essential hypertension I10 401.9 3. Depression, unspecified depression type F32.9 311 Will add singulair for allergy sx. Continue nasonex which was refilled. BP improved on manual check. Continue with home monitoring. [facility] Depressive sx better on increased dose of Celexa which is tolerated. Medication refilled GERD controlled with Aciphex but not with other PPIs. Reordered, ?  Need for PA

## 2019-04-18 NOTE — PATIENT INSTRUCTIONS

## 2019-04-18 NOTE — PROGRESS NOTES
Rm: 17 Chief Complaint Patient presents with  Hypertension Depression Screening: 
3 most recent [facility] [place] Screens 4/18/2019 1/18/2019 10/15/2018 Little interest or pleasure in doing things Not at all Not at all Not at all Feeling down, depressed, irritable, or hopeless Not at all Not at all Not at all Total Score PHQ 2 0 0 0 Learning Assessment: 
No flowsheet data found. Abuse Screening: No flowsheet data found. Health Maintenance reviewed and discussed per provider: yes Coordination of Care: 1. Have you been to the ER, urgent care clinic since your last visit? Hospitalized since your last visit? no 
 
2. Have you seen or consulted any other health care providers outside of the [address] [place] [address] since your last visit? Include any pap smears or colon screening.  no

## 2019-04-24 ENCOUNTER — TELEPHONE (OUTPATIENT)
Dept: INTERNAL MEDICINE CLINIC | Age: 74
End: 2019-04-24

## 2019-04-24 PROBLEM — K21.9 GASTROESOPHAGEAL REFLUX DISEASE WITHOUT ESOPHAGITIS: Status: ACTIVE | Noted: 2017-07-25

## 2019-04-24 NOTE — TELEPHONE ENCOUNTER
Prior auth for RABEprazole (ACIPHEX) 20 mg tablet received and completed. PA approved.       [facility] contacted and informed of approval.

## 2019-05-22 ENCOUNTER — OFFICE VISIT (OUTPATIENT)
Dept: INTERNAL MEDICINE CLINIC | Age: 74
End: 2019-05-22

## 2019-05-22 VITALS
HEIGHT: 62 IN | TEMPERATURE: 97.9 F | HEART RATE: 74 BPM | OXYGEN SATURATION: 97 % | DIASTOLIC BLOOD PRESSURE: 90 MMHG | RESPIRATION RATE: 18 BRPM | BODY MASS INDEX: 32.31 KG/M2 | WEIGHT: 175.6 LBS | SYSTOLIC BLOOD PRESSURE: 150 MMHG

## 2019-05-22 DIAGNOSIS — R05.9 COUGH: Primary | ICD-10-CM

## 2019-05-22 DIAGNOSIS — I10 ESSENTIAL HYPERTENSION: ICD-10-CM

## 2019-05-22 RX ORDER — AZITHROMYCIN 250 MG/1
TABLET, FILM COATED ORAL
Qty: 6 TAB | Refills: 0 | Status: SHIPPED | OUTPATIENT
Start: 2019-05-22 | End: 2019-05-22

## 2019-05-22 RX ORDER — AMOXICILLIN AND CLAVULANATE POTASSIUM 875; 125 MG/1; MG/1
1 TABLET, FILM COATED ORAL 2 TIMES DAILY
Qty: 14 TAB | Refills: 0 | Status: SHIPPED | OUTPATIENT
Start: 2019-05-22 | End: 2019-07-18 | Stop reason: ALTCHOICE

## 2019-05-22 NOTE — PROGRESS NOTES
Rm; 16    Chief Complaint   Patient presents with    Nasal Congestion     cough     Depression Screening:  3 most recent PHQ Screens 5/22/2019 4/18/2019 1/18/2019 10/15/2018   Little interest or pleasure in doing things Not at all Not at all Not at all Not at all   Feeling down, depressed, irritable, or hopeless Not at all Not at all Not at all Not at all   Total Score PHQ 2 0 0 0 0       Learning Assessment:  No flowsheet data found. Abuse Screening:  No flowsheet data found. Health Maintenance reviewed and discussed per provider: yes     Coordination of Care:    1. Have you been to the ER, urgent care clinic since your last visit? Hospitalized since your last visit? no    2. Have you seen or consulted any other health care providers outside of the [address] [place] [address] since your last visit? Include any pap smears or colon screening.  no

## 2019-05-22 NOTE — PROGRESS NOTES
HISTORY OF PRESENT ILLNESS  Yana Rivero is a 68 y.o. female. Here for f/u of cough which has been persisting since last visit. Some improvement with singulair. Continued nasal congestion, sinus pressure. No documented fever, but feels warm at times. Feels she has decreased energy. Some dyspnea at times. HTN: BP elevated on arrival. Better on manual check. No CP. Review of Systems   Constitutional: Negative for chills, fever and weight loss. HENT: Positive for congestion and sinus pain. Negative for ear pain. Eyes: Negative for blurred vision and pain. Respiratory: Positive for cough and shortness of breath (occasional). Cardiovascular: Negative for chest pain, palpitations and leg swelling. Gastrointestinal: Negative for nausea and vomiting. Genitourinary: Negative for frequency and urgency. Musculoskeletal: Negative for joint pain and myalgias. Skin: Negative for itching and rash. Neurological: Negative for dizziness, tingling and headaches. Psychiatric/Behavioral: Negative for depression. The patient is not nervous/anxious. Past Medical History:   Diagnosis Date    Arthritis     Thyroid disease      Current Outpatient Medications on File Prior to Visit   Medication Sig Dispense Refill    mometasone (NASONEX) 50 mcg/actuation nasal spray 2 Sprays by Both Nostrils route daily. Indications: inflammation of the nose due to an allergy 3 Container 3    citalopram (CELEXA) 20 mg tablet Take 1 Tab by mouth daily. 90 Tab 3    RABEprazole (ACIPHEX) 20 mg tablet take 1 tablet by mouth once daily swallow WHOLE 90 Tab 3    montelukast (SINGULAIR) 10 mg tablet Take 1 Tab by mouth daily.  Indications: inflammation of the nose due to an allergy 90 Tab 3    levothyroxine (SYNTHROID) 25 mcg tablet take 1 tablet by mouth once daily 90 Tab 3    atorvastatin (LIPITOR) 20 mg tablet take 1 tablet by mouth once daily  0    celecoxib (CELEBREX) 200 mg capsule       diclofenac (VOLTAREN) 1 % gel       LYRICA 25 mg capsule   0    trimethoprim-sulfamethoxazole (BACTRIM DS, SEPTRA DS) 160-800 mg per tablet take 1 tablet by mouth every 12 hours  0     No current facility-administered medications on file prior to visit. Allergies   Allergen Reactions    Other Plant, Animal, Environmental Other (comments)     Pollen and mold-Seasonal     Physical Exam   Constitutional: She appears well-developed and well-nourished. No distress. /90 (BP 1 Location: Right arm, BP Patient Position: Sitting)   Pulse 74   Temp 97.9 °F (36.6 °C) (Oral)   Resp 18   Ht 5' 2\" (1.575 m)   Wt 175 lb 9.6 oz (79.7 kg)   SpO2 97%   BMI 32.12 kg/m²      Eyes: EOM are normal. Right eye exhibits no discharge. Left eye exhibits no discharge. No scleral icterus. Neck: Neck supple. Cardiovascular: Normal rate, regular rhythm and normal heart sounds. Exam reveals no gallop and no friction rub. No murmur heard. Pulmonary/Chest: Effort normal. No respiratory distress. She has wheezes (scattered L upper lung). She has no rales. Musculoskeletal: She exhibits no edema or tenderness. Lymphadenopathy:     She has no cervical adenopathy. Neurological: She is alert. She exhibits normal muscle tone. Skin: Skin is warm and dry. Psychiatric: She has a normal mood and affect. Lab Results   Component Value Date/Time    Sodium 140 01/18/2019 02:45 PM    Potassium 4.4 01/18/2019 02:45 PM    Chloride 106 01/18/2019 02:45 PM    CO2 29 01/18/2019 02:45 PM    Anion gap 5 01/18/2019 02:45 PM    Glucose 114 (H) 01/18/2019 02:45 PM    BUN 13 01/18/2019 02:45 PM    Creatinine 0.96 01/18/2019 02:45 PM    BUN/Creatinine ratio 14 01/18/2019 02:45 PM    GFR est AA >60 01/18/2019 02:45 PM    GFR est non-AA 57 (L) 01/18/2019 02:45 PM    Calcium 8.7 01/18/2019 02:45 PM    Bilirubin, total 0.3 01/18/2019 02:45 PM    AST (SGOT) 21 01/18/2019 02:45 PM    Alk.  phosphatase 79 01/18/2019 02:45 PM    Protein, total 6.8 01/18/2019 02:45 PM Albumin 3.8 01/18/2019 02:45 PM    Globulin 3.0 01/18/2019 02:45 PM    A-G Ratio 1.3 01/18/2019 02:45 PM    ALT (SGPT) 39 01/18/2019 02:45 PM     ASSESSMENT and PLAN    ICD-10-CM ICD-9-CM    1. Cough R05 786.2 XR CHEST PA LAT   2. Essential hypertension I10 401.9      ? Sinusitis, bronchitis vs allergy sx. Will treat with course of augmentin. CXR today given lung findings. Monitor BP at home.    Consider referral to cardiology if dyspnea increases despite tx given CV risk

## 2019-05-22 NOTE — PATIENT INSTRUCTIONS
Chronic Cough: Care Instructions Your Care Instructions A cough is your body's response to something that bothers your throat or airways. Many things can cause a cough. You might cough because of a cold or the flu, bronchitis, or asthma. Smoking, postnasal drip, allergies, and stomach acid that backs up into your throat also can cause a cough. A cough can be short-term (acute) or long-term (chronic). A chronic cough lasts more than 3 weeks. A chronic cough is often caused by a long-term problem, such as asthma. Another cause might be a medicine, such as an ACE inhibitor. A cough is a symptom, not a disease. To treat a chronic cough, you may need to treat the problem that causes it. You can take a few steps at home to cough less and feel better. Some people cough or clear their throat out of habit for no clear reason. Follow-up care is a key part of your treatment and safety. Be sure to make and go to all appointments, and call your doctor if you are having problems. It's also a good idea to know your test results and keep a list of the medicines you take. How can you care for yourself at home? · Drink plenty of water and other fluids. This may help soothe a dry or sore throat. Honey or lemon juice in hot water or tea may ease a dry cough. · Prop up your head on pillows to help you breathe and ease a cough. · Do not smoke or allow others to smoke around you. Smoke can make a cough worse. If you need help quitting, talk to your doctor about stop-smoking programs and medicines. These can increase your chances of quitting for good. · Avoid exposure to smoke, dust, or other pollutants, or wear a face mask. Check with your doctor or pharmacist to find out which type of face mask will give you the most benefit. · Take cough medicine as directed by your doctor. · Try cough drops to soothe a dry or sore throat. Cough drops don't stop a cough.  Medicine-flavored cough drops are no better than candy-flavored drops or hard candy. Throat clearing When you have a chronic cough or a disease that may cause this type of cough, you may often feel like you want to clear your throat. This helps bring up mucus. But throat clearing does not always have a cause. Throat clearing can become a habit. The more you do it, the more you feel like you need to do it. But frequent throat clearing can be hard on your vocal cords. It's like slamming them together. To help lessen throat clearing, you can try: · Taking small sips of water. · Not clearing your throat when you feel you need to. · Swallowing hard when you want to clear your throat. You may want to ask your doctor if a medicine that thins mucus would help. When should you call for help? Call 911 anytime you think you may need emergency care. For example, call if: 
  · You have severe trouble breathing.  
 Call your doctor now or seek immediate medical care if: 
  · You cough up blood.  
  · You have new or worse trouble breathing.  
  · You have a new or higher fever.  
 Watch closely for changes in your health, and be sure to contact your doctor if: 
  · You cough more deeply or more often, especially if you notice more mucus or a change in the color of your mucus.  
  · You do not get better as expected. Where can you learn more? Go to http://ayden-clifford.info/. Enter A107 in the search box to learn more about \"Chronic Cough: Care Instructions. \" Current as of: September 5, 2018 Content Version: 11.9 © 5031-3038 [employer]. Care instructions adapted under license by [employer] (which disclaims liability or warranty for this information). If you have questions about a medical condition or this instruction, always ask your healthcare professional. [address] any warranty or liability for your use of this information.

## 2019-06-17 RX ORDER — ATORVASTATIN CALCIUM 20 MG/1
TABLET, FILM COATED ORAL
Qty: 90 TAB | Refills: 3 | Status: SHIPPED | OUTPATIENT
Start: 2019-06-17

## 2019-06-17 RX ORDER — CELECOXIB 200 MG/1
200 CAPSULE ORAL
Qty: 180 CAP | Refills: 3 | Status: SHIPPED | OUTPATIENT
Start: 2019-06-17

## 2019-06-20 ENCOUNTER — TELEPHONE (OUTPATIENT)
Dept: INTERNAL MEDICINE CLINIC | Age: 74
End: 2019-06-20

## 2019-06-20 NOTE — TELEPHONE ENCOUNTER
Please contact covermymeds to initiate prior authorization on patients Rabeprazole Sodium 20 mg DR Tablet please submit when available

## 2019-06-20 NOTE — TELEPHONE ENCOUNTER
[facility] contacted and informed this PA has already been approved 04/2019 approval is good until 04/2020. [facility] for patient to , no PA required at this time.

## 2019-07-18 ENCOUNTER — OFFICE VISIT (OUTPATIENT)
Dept: INTERNAL MEDICINE CLINIC | Age: 74
End: 2019-07-18

## 2019-07-18 VITALS
SYSTOLIC BLOOD PRESSURE: 149 MMHG | OXYGEN SATURATION: 96 % | BODY MASS INDEX: 32.72 KG/M2 | HEART RATE: 77 BPM | DIASTOLIC BLOOD PRESSURE: 75 MMHG | RESPIRATION RATE: 18 BRPM | HEIGHT: 62 IN | WEIGHT: 177.8 LBS | TEMPERATURE: 97.3 F

## 2019-07-18 DIAGNOSIS — R09.89 SINUS SYMPTOM: ICD-10-CM

## 2019-07-18 DIAGNOSIS — I10 ESSENTIAL HYPERTENSION: Primary | ICD-10-CM

## 2019-07-18 RX ORDER — LANOLIN ALCOHOL/MO/W.PET/CERES
1000 CREAM (GRAM) TOPICAL DAILY
COMMUNITY

## 2019-07-18 NOTE — PATIENT INSTRUCTIONS
High Blood Pressure: Care Instructions  Overview    It's normal for blood pressure to go up and down throughout the day. But if it stays up, you have high blood pressure. Another name for high blood pressure is hypertension. Despite what a lot of people think, high blood pressure usually doesn't cause headaches or make you feel dizzy or lightheaded. It usually has no symptoms. But it does increase your risk of stroke, heart attack, and other problems. You and your doctor will talk about your risks of these problems based on your blood pressure. Your doctor will give you a goal for your blood pressure. Your goal will be based on your health and your age. Lifestyle changes, such as eating healthy and being active, are always important to help lower blood pressure. You might also take medicine to reach your blood pressure goal.  Follow-up care is a key part of your treatment and safety. Be sure to make and go to all appointments, and call your doctor if you are having problems. It's also a good idea to know your test results and keep a list of the medicines you take. How can you care for yourself at home? Medical treatment  · If you stop taking your medicine, your blood pressure will go back up. You may take one or more types of medicine to lower your blood pressure. Be safe with medicines. Take your medicine exactly as prescribed. Call your doctor if you think you are having a problem with your medicine. · Talk to your doctor before you start taking aspirin every day. Aspirin can help certain people lower their risk of a heart attack or stroke. But taking aspirin isn't right for everyone, because it can cause serious bleeding. · See your doctor regularly. You may need to see the doctor more often at first or until your blood pressure comes down. · If you are taking blood pressure medicine, talk to your doctor before you take decongestants or anti-inflammatory medicine, such as ibuprofen.  Some of these medicines can raise blood pressure. · Learn how to check your blood pressure at home. Lifestyle changes  · Stay at a healthy weight. This is especially important if you put on weight around the waist. Losing even 10 pounds can help you lower your blood pressure. · If your doctor recommends it, get more exercise. Walking is a good choice. Bit by bit, increase the amount you walk every day. Try for at least 30 minutes on most days of the week. You also may want to swim, bike, or do other activities. · Avoid or limit alcohol. Talk to your doctor about whether you can drink any alcohol. · Try to limit how much sodium you eat to less than 2,300 milligrams (mg) a day. Your doctor may ask you to try to eat less than 1,500 mg a day. · Eat plenty of fruits (such as bananas and oranges), vegetables, legumes, whole grains, and low-fat dairy products. · Lower the amount of saturated fat in your diet. Saturated fat is found in animal products such as milk, cheese, and meat. Limiting these foods may help you lose weight and also lower your risk for heart disease. · Do not smoke. Smoking increases your risk for heart attack and stroke. If you need help quitting, talk to your doctor about stop-smoking programs and medicines. These can increase your chances of quitting for good. When should you call for help? Call 911 anytime you think you may need emergency care. This may mean having symptoms that suggest that your blood pressure is causing a serious heart or blood vessel problem. Your blood pressure may be over 180/120.   For example, call 911 if:    · You have symptoms of a heart attack. These may include:  ? Chest pain or pressure, or a strange feeling in the chest.  ? Sweating. ? Shortness of breath. ? Nausea or vomiting. ? Pain, pressure, or a strange feeling in the back, neck, jaw, or upper belly or in one or both shoulders or arms. ? Lightheadedness or sudden weakness.   ? A fast or irregular heartbeat.     · You have symptoms of a stroke. These may include:  ? Sudden numbness, tingling, weakness, or loss of movement in your face, arm, or leg, especially on only one side of your body. ? Sudden vision changes. ? Sudden trouble speaking. ? Sudden confusion or trouble understanding simple statements. ? Sudden problems with walking or balance. ? A sudden, severe headache that is different from past headaches.     · You have severe back or belly pain.    Do not wait until your blood pressure comes down on its own. Get help right away.   Call your doctor now or seek immediate care if:    · Your blood pressure is much higher than normal (such as 180/120 or higher), but you don't have symptoms.     · You think high blood pressure is causing symptoms, such as:  ? Severe headache.  ? Blurry vision.    Watch closely for changes in your health, and be sure to contact your doctor if:    · Your blood pressure measures higher than your doctor recommends at least 2 times. That means the top number is higher or the bottom number is higher, or both.     · You think you may be having side effects from your blood pressure medicine. Where can you learn more? Go to http://ayden-clifford.info/. Enter V323 in the search box to learn more about \"High Blood Pressure: Care Instructions. \"  Current as of: July 22, 2018  Content Version: 11.9  © 9198-4452 [employer]. Care instructions adapted under license by [employer] (which disclaims liability or warranty for this information). If you have questions about a medical condition or this instruction, always ask your healthcare professional. [place] [address] any warranty or liability for your use of this information.

## 2019-07-18 NOTE — PROGRESS NOTES
Rm: 25    Chief Complaint   Patient presents with    Hypertension     Depression Screening:  3 most recent PHQ Screens 7/18/2019 5/22/2019 4/18/2019 1/18/2019 10/15/2018   Little interest or pleasure in doing things Not at all Not at all Not at all Not at all Not at all   Feeling down, depressed, irritable, or hopeless Not at all Not at all Not at all Not at all Not at all   Total Score PHQ 2 0 0 0 0 0       Learning Assessment:  No flowsheet data found. Abuse Screening:  No flowsheet data found. Health Maintenance reviewed and discussed per provider: yes     Coordination of Care:    1. Have you been to the ER, urgent care clinic since your last visit? Hospitalized since your last visit? no    2. Have you seen or consulted any other health care providers outside of the [address] [place] [address] since your last visit? Include any pap smears or colon screening.  no

## 2019-07-18 NOTE — PROGRESS NOTES
HISTORY OF PRESENT ILLNESS  Gifty Ritter is a 68 y.o. female. Here for f/u of HTN, recent sinusitis. Feeling much better after course of Augmentin. Continues with her usual allergy medications at this time. BP has been stable when monitoring at home. No CP, SOB. Review of Systems   Constitutional: Negative for chills, fever and weight loss. HENT: Negative for congestion and ear pain. Eyes: Negative for blurred vision and pain. Respiratory: Negative for cough and shortness of breath. Cardiovascular: Negative for chest pain, palpitations and leg swelling. Gastrointestinal: Negative for nausea and vomiting. Genitourinary: Negative for frequency and urgency. Musculoskeletal: Negative for joint pain and myalgias. Skin: Negative for itching and rash. Neurological: Negative for dizziness, tingling and headaches. Psychiatric/Behavioral: Negative for depression. The patient is not nervous/anxious. Past Medical History:   Diagnosis Date    Arthritis     Thyroid disease      Current Outpatient Medications on File Prior to Visit   Medication Sig Dispense Refill    cyanocobalamin (VITAMIN B-12) 1,000 mcg tablet Take 1,000 mcg by mouth daily.  celecoxib (CELEBREX) 200 mg capsule Take 1 Cap by mouth two (2) times daily as needed for Pain. 180 Cap 3    atorvastatin (LIPITOR) 20 mg tablet take 1 tablet by mouth once daily 90 Tab 3    mometasone (NASONEX) 50 mcg/actuation nasal spray 2 Sprays by Both Nostrils route daily. Indications: inflammation of the nose due to an allergy 3 Container 3    citalopram (CELEXA) 20 mg tablet Take 1 Tab by mouth daily. 90 Tab 3    RABEprazole (ACIPHEX) 20 mg tablet take 1 tablet by mouth once daily swallow WHOLE 90 Tab 3    montelukast (SINGULAIR) 10 mg tablet Take 1 Tab by mouth daily.  Indications: inflammation of the nose due to an allergy 90 Tab 3    levothyroxine (SYNTHROID) 25 mcg tablet take 1 tablet by mouth once daily 90 Tab 3    diclofenac (VOLTAREN) 1 % gel       LYRICA 25 mg capsule   0    trimethoprim-sulfamethoxazole (BACTRIM DS, SEPTRA DS) 160-800 mg per tablet take 1 tablet by mouth every 12 hours  0     No current facility-administered medications on file prior to visit. Physical Exam   Constitutional: She appears well-developed and well-nourished. No distress. /75 (BP 1 Location: Left arm, BP Patient Position: Sitting)   Pulse 77   Temp 97.3 °F (36.3 °C) (Oral)   Resp 18   Ht 5' 2\" (1.575 m)   Wt 177 lb 12.8 oz (80.6 kg)   SpO2 96%   BMI 32.52 kg/m²      Eyes: EOM are normal. Right eye exhibits no discharge. Left eye exhibits no discharge. No scleral icterus. Neck: Neck supple. Cardiovascular: Normal rate, regular rhythm and normal heart sounds. Exam reveals no gallop and no friction rub. No murmur heard. Pulmonary/Chest: Effort normal and breath sounds normal. No respiratory distress. She has no wheezes. She has no rales. Musculoskeletal: She exhibits no edema or tenderness. Lymphadenopathy:     She has no cervical adenopathy. Neurological: She is alert. She exhibits normal muscle tone. Skin: Skin is warm and dry. Psychiatric: She has a normal mood and affect. Lab Results   Component Value Date/Time    TSH 0.59 01/18/2019 02:45 PM    T4, Free 1.0 01/18/2019 02:45 PM     Lab Results   Component Value Date/Time    Sodium 140 01/18/2019 02:45 PM    Potassium 4.4 01/18/2019 02:45 PM    Chloride 106 01/18/2019 02:45 PM    CO2 29 01/18/2019 02:45 PM    Anion gap 5 01/18/2019 02:45 PM    Glucose 114 (H) 01/18/2019 02:45 PM    BUN 13 01/18/2019 02:45 PM    Creatinine 0.96 01/18/2019 02:45 PM    BUN/Creatinine ratio 14 01/18/2019 02:45 PM    GFR est AA >60 01/18/2019 02:45 PM    GFR est non-AA 57 (L) 01/18/2019 02:45 PM    Calcium 8.7 01/18/2019 02:45 PM    Bilirubin, total 0.3 01/18/2019 02:45 PM    AST (SGOT) 21 01/18/2019 02:45 PM    Alk.  phosphatase 79 01/18/2019 02:45 PM    Protein, total 6.8 01/18/2019 02:45 PM    Albumin 3.8 01/18/2019 02:45 PM    Globulin 3.0 01/18/2019 02:45 PM    A-G Ratio 1.3 01/18/2019 02:45 PM    ALT (SGPT) 39 01/18/2019 02:45 PM     ASSESSMENT and PLAN    ICD-10-CM ICD-9-CM    1. Essential hypertension I10 401.9    2. Sinus symptom R09.89 786.9      BP stable at this time. Continue with current medication  Sinus sx improved after augmentin for sinusitis.  Continue with current medication for her chronic allergy sx

## 2019-09-03 ENCOUNTER — TELEPHONE (OUTPATIENT)
Dept: INTERNAL MEDICINE CLINIC | Age: 74
End: 2019-09-03

## 2019-09-03 NOTE — TELEPHONE ENCOUNTER
Prior auth request received for Rabeprazole sodium 20 mg DR tablets. Prior auth completed via cover my meds. Your demographic data has been sent to [employer] successfully. They will respond shortly with your clinical questions and you will be notified by email when available.

## 2019-09-03 NOTE — TELEPHONE ENCOUNTER
Prior [facility] has been approved. This authorization is valid 03/25/2019-04/23/2020.

## 2020-01-22 ENCOUNTER — DOCUMENTATION ONLY (OUTPATIENT)
Dept: INTERNAL MEDICINE CLINIC | Age: 75
End: 2020-01-22

## 2020-11-11 ENCOUNTER — TRANSCRIBE ORDER (OUTPATIENT)
Dept: SCHEDULING | Age: 75
End: 2020-11-11

## 2020-11-11 DIAGNOSIS — Z12.31 VISIT FOR SCREENING MAMMOGRAM: Primary | ICD-10-CM

## 2021-01-05 ENCOUNTER — HOSPITAL ENCOUNTER (OUTPATIENT)
Dept: MAMMOGRAPHY | Age: 76
Discharge: HOME | End: 2021-01-05
Attending: INTERNAL MEDICINE
Payer: MEDICARE

## 2021-01-05 DIAGNOSIS — Z12.31 VISIT FOR SCREENING MAMMOGRAM: ICD-10-CM

## 2021-01-05 PROCEDURE — 77063 BREAST TOMOSYNTHESIS BI: CPT
